# Patient Record
Sex: FEMALE | Race: WHITE | NOT HISPANIC OR LATINO | URBAN - METROPOLITAN AREA
[De-identification: names, ages, dates, MRNs, and addresses within clinical notes are randomized per-mention and may not be internally consistent; named-entity substitution may affect disease eponyms.]

---

## 2024-03-28 ENCOUNTER — INPATIENT (INPATIENT)
Facility: HOSPITAL | Age: 29
LOS: 3 days | Discharge: HOME | End: 2024-04-01
Attending: OBSTETRICS & GYNECOLOGY | Admitting: OBSTETRICS & GYNECOLOGY
Payer: COMMERCIAL

## 2024-03-28 VITALS — SYSTOLIC BLOOD PRESSURE: 124 MMHG | DIASTOLIC BLOOD PRESSURE: 83 MMHG

## 2024-03-28 DIAGNOSIS — Z90.89 ACQUIRED ABSENCE OF OTHER ORGANS: Chronic | ICD-10-CM

## 2024-03-28 DIAGNOSIS — Z34.80 ENCOUNTER FOR SUPERVISION OF OTHER NORMAL PREGNANCY, UNSPECIFIED TRIMESTER: ICD-10-CM

## 2024-03-28 DIAGNOSIS — O26.899 OTHER SPECIFIED PREGNANCY RELATED CONDITIONS, UNSPECIFIED TRIMESTER: ICD-10-CM

## 2024-03-28 DIAGNOSIS — K08.409 PARTIAL LOSS OF TEETH, UNSPECIFIED CAUSE, UNSPECIFIED CLASS: Chronic | ICD-10-CM

## 2024-03-28 DIAGNOSIS — Z98.890 OTHER SPECIFIED POSTPROCEDURAL STATES: Chronic | ICD-10-CM

## 2024-03-28 DIAGNOSIS — Z34.90 ENCOUNTER FOR SUPERVISION OF NORMAL PREGNANCY, UNSPECIFIED, UNSPECIFIED TRIMESTER: ICD-10-CM

## 2024-03-28 LAB
ALBUMIN SERPL ELPH-MCNC: 3.7 G/DL — SIGNIFICANT CHANGE UP (ref 3.3–5)
ALP SERPL-CCNC: 310 U/L — HIGH (ref 40–120)
ALT FLD-CCNC: 88 U/L — HIGH (ref 10–45)
ANION GAP SERPL CALC-SCNC: 13 MMOL/L — SIGNIFICANT CHANGE UP (ref 5–17)
APPEARANCE UR: ABNORMAL
AST SERPL-CCNC: 49 U/L — HIGH (ref 10–40)
BACTERIA # UR AUTO: NEGATIVE /HPF — SIGNIFICANT CHANGE UP
BASOPHILS # BLD AUTO: 0 K/UL — SIGNIFICANT CHANGE UP (ref 0–0.2)
BASOPHILS # BLD AUTO: 0.12 K/UL — SIGNIFICANT CHANGE UP (ref 0–0.2)
BASOPHILS NFR BLD AUTO: 0 % — SIGNIFICANT CHANGE UP (ref 0–2)
BASOPHILS NFR BLD AUTO: 0.6 % — SIGNIFICANT CHANGE UP (ref 0–2)
BILIRUB SERPL-MCNC: 0.7 MG/DL — SIGNIFICANT CHANGE UP (ref 0.2–1.2)
BILIRUB UR-MCNC: ABNORMAL
BUN SERPL-MCNC: 11 MG/DL — SIGNIFICANT CHANGE UP (ref 7–23)
CALCIUM SERPL-MCNC: 9.8 MG/DL — SIGNIFICANT CHANGE UP (ref 8.4–10.5)
CAST: 4 /LPF — SIGNIFICANT CHANGE UP (ref 0–4)
CHLORIDE SERPL-SCNC: 100 MMOL/L — SIGNIFICANT CHANGE UP (ref 96–108)
CO2 SERPL-SCNC: 21 MMOL/L — LOW (ref 22–31)
COLOR SPEC: SIGNIFICANT CHANGE UP
CREAT ?TM UR-MCNC: 168 MG/DL — SIGNIFICANT CHANGE UP
CREAT SERPL-MCNC: 0.75 MG/DL — SIGNIFICANT CHANGE UP (ref 0.5–1.3)
DIFF PNL FLD: ABNORMAL
EGFR: 112 ML/MIN/1.73M2 — SIGNIFICANT CHANGE UP
EOSINOPHIL # BLD AUTO: 0.02 K/UL — SIGNIFICANT CHANGE UP (ref 0–0.5)
EOSINOPHIL # BLD AUTO: 0.33 K/UL — SIGNIFICANT CHANGE UP (ref 0–0.5)
EOSINOPHIL NFR BLD AUTO: 0.1 % — SIGNIFICANT CHANGE UP (ref 0–6)
EOSINOPHIL NFR BLD AUTO: 1.7 % — SIGNIFICANT CHANGE UP (ref 0–6)
GLUCOSE SERPL-MCNC: 89 MG/DL — SIGNIFICANT CHANGE UP (ref 70–99)
GLUCOSE UR QL: NEGATIVE MG/DL — SIGNIFICANT CHANGE UP
HCT VFR BLD CALC: 36.9 % — SIGNIFICANT CHANGE UP (ref 34.5–45)
HCT VFR BLD CALC: 38 % — SIGNIFICANT CHANGE UP (ref 34.5–45)
HGB BLD-MCNC: 12.6 G/DL — SIGNIFICANT CHANGE UP (ref 11.5–15.5)
HGB BLD-MCNC: 13 G/DL — SIGNIFICANT CHANGE UP (ref 11.5–15.5)
IMM GRANULOCYTES NFR BLD AUTO: 0.9 % — SIGNIFICANT CHANGE UP (ref 0–0.9)
KETONES UR-MCNC: NEGATIVE MG/DL — SIGNIFICANT CHANGE UP
LDH SERPL L TO P-CCNC: 179 U/L — SIGNIFICANT CHANGE UP (ref 50–242)
LEUKOCYTE ESTERASE UR-ACNC: ABNORMAL
LYMPHOCYTES # BLD AUTO: 1.97 K/UL — SIGNIFICANT CHANGE UP (ref 1–3.3)
LYMPHOCYTES # BLD AUTO: 10 % — LOW (ref 13–44)
LYMPHOCYTES # BLD AUTO: 12.2 % — LOW (ref 13–44)
LYMPHOCYTES # BLD AUTO: 2.34 K/UL — SIGNIFICANT CHANGE UP (ref 1–3.3)
MANUAL SMEAR VERIFICATION: SIGNIFICANT CHANGE UP
MCHC RBC-ENTMCNC: 28.2 PG — SIGNIFICANT CHANGE UP (ref 27–34)
MCHC RBC-ENTMCNC: 28.5 PG — SIGNIFICANT CHANGE UP (ref 27–34)
MCHC RBC-ENTMCNC: 33.2 GM/DL — SIGNIFICANT CHANGE UP (ref 32–36)
MCHC RBC-ENTMCNC: 35.2 GM/DL — SIGNIFICANT CHANGE UP (ref 32–36)
MCV RBC AUTO: 80.9 FL — SIGNIFICANT CHANGE UP (ref 80–100)
MCV RBC AUTO: 85 FL — SIGNIFICANT CHANGE UP (ref 80–100)
MONOCYTES # BLD AUTO: 1.33 K/UL — HIGH (ref 0–0.9)
MONOCYTES # BLD AUTO: 1.56 K/UL — HIGH (ref 0–0.9)
MONOCYTES NFR BLD AUTO: 6.9 % — SIGNIFICANT CHANGE UP (ref 2–14)
MONOCYTES NFR BLD AUTO: 7.9 % — SIGNIFICANT CHANGE UP (ref 2–14)
MYELOCYTES NFR BLD: 0.9 % — HIGH (ref 0–0)
NEUTROPHILS # BLD AUTO: 15.04 K/UL — HIGH (ref 1.8–7.4)
NEUTROPHILS # BLD AUTO: 15.86 K/UL — HIGH (ref 1.8–7.4)
NEUTROPHILS NFR BLD AUTO: 77.4 % — HIGH (ref 43–77)
NEUTROPHILS NFR BLD AUTO: 80.5 % — HIGH (ref 43–77)
NEUTS BAND # BLD: 0.9 % — SIGNIFICANT CHANGE UP (ref 0–8)
NITRITE UR-MCNC: NEGATIVE — SIGNIFICANT CHANGE UP
NRBC # BLD: 0 /100 WBCS — SIGNIFICANT CHANGE UP (ref 0–0)
PH UR: 7 — SIGNIFICANT CHANGE UP (ref 5–8)
PLAT MORPH BLD: NORMAL — SIGNIFICANT CHANGE UP
PLATELET # BLD AUTO: 305 K/UL — SIGNIFICANT CHANGE UP (ref 150–400)
PLATELET # BLD AUTO: 325 K/UL — SIGNIFICANT CHANGE UP (ref 150–400)
POTASSIUM SERPL-MCNC: 5 MMOL/L — SIGNIFICANT CHANGE UP (ref 3.5–5.3)
POTASSIUM SERPL-SCNC: 5 MMOL/L — SIGNIFICANT CHANGE UP (ref 3.5–5.3)
PROT ?TM UR-MCNC: 262 MG/DL — HIGH (ref 0–12)
PROT SERPL-MCNC: 7.3 G/DL — SIGNIFICANT CHANGE UP (ref 6–8.3)
PROT UR-MCNC: 300 MG/DL
PROT/CREAT UR-RTO: 1.6 RATIO — HIGH (ref 0–0.2)
RBC # BLD: 4.47 M/UL — SIGNIFICANT CHANGE UP (ref 3.8–5.2)
RBC # BLD: 4.56 M/UL — SIGNIFICANT CHANGE UP (ref 3.8–5.2)
RBC # FLD: 13.4 % — SIGNIFICANT CHANGE UP (ref 10.3–14.5)
RBC # FLD: 13.6 % — SIGNIFICANT CHANGE UP (ref 10.3–14.5)
RBC BLD AUTO: SIGNIFICANT CHANGE UP
RBC CASTS # UR COMP ASSIST: 612 /HPF — HIGH (ref 0–4)
SODIUM SERPL-SCNC: 134 MMOL/L — LOW (ref 135–145)
SP GR SPEC: 1.02 — SIGNIFICANT CHANGE UP (ref 1–1.03)
SQUAMOUS # UR AUTO: 6 /HPF — HIGH (ref 0–5)
URATE SERPL-MCNC: 5 MG/DL — SIGNIFICANT CHANGE UP (ref 2.5–7)
UROBILINOGEN FLD QL: 1 MG/DL — SIGNIFICANT CHANGE UP (ref 0.2–1)
WBC # BLD: 19.21 K/UL — HIGH (ref 3.8–10.5)
WBC # BLD: 19.7 K/UL — HIGH (ref 3.8–10.5)
WBC # FLD AUTO: 19.21 K/UL — HIGH (ref 3.8–10.5)
WBC # FLD AUTO: 19.7 K/UL — HIGH (ref 3.8–10.5)
WBC UR QL: 20 /HPF — HIGH (ref 0–5)

## 2024-03-28 RX ORDER — PRAMOXINE HYDROCHLORIDE 150 MG/15G
1 AEROSOL, FOAM RECTAL EVERY 4 HOURS
Refills: 0 | Status: DISCONTINUED | OUTPATIENT
Start: 2024-03-28 | End: 2024-04-01

## 2024-03-28 RX ORDER — PIPERACILLIN AND TAZOBACTAM 4; .5 G/20ML; G/20ML
4.5 INJECTION, POWDER, LYOPHILIZED, FOR SOLUTION INTRAVENOUS EVERY 8 HOURS
Refills: 0 | Status: DISCONTINUED | OUTPATIENT
Start: 2024-03-29 | End: 2024-03-29

## 2024-03-28 RX ORDER — AER TRAVELER 0.5 G/1
1 SOLUTION RECTAL; TOPICAL EVERY 4 HOURS
Refills: 0 | Status: DISCONTINUED | OUTPATIENT
Start: 2024-03-28 | End: 2024-04-01

## 2024-03-28 RX ORDER — BENZOCAINE 10 %
1 GEL (GRAM) MUCOUS MEMBRANE EVERY 6 HOURS
Refills: 0 | Status: DISCONTINUED | OUTPATIENT
Start: 2024-03-28 | End: 2024-04-01

## 2024-03-28 RX ORDER — PIPERACILLIN AND TAZOBACTAM 4; .5 G/20ML; G/20ML
4.5 INJECTION, POWDER, LYOPHILIZED, FOR SOLUTION INTRAVENOUS ONCE
Refills: 0 | Status: COMPLETED | OUTPATIENT
Start: 2024-03-28 | End: 2024-03-28

## 2024-03-28 RX ORDER — OXYTOCIN 10 UNIT/ML
41.67 VIAL (ML) INJECTION
Qty: 20 | Refills: 0 | Status: DISCONTINUED | OUTPATIENT
Start: 2024-03-28 | End: 2024-04-01

## 2024-03-28 RX ORDER — MAGNESIUM SULFATE 500 MG/ML
4 VIAL (ML) INJECTION ONCE
Refills: 0 | Status: COMPLETED | OUTPATIENT
Start: 2024-03-28 | End: 2024-03-28

## 2024-03-28 RX ORDER — LANOLIN
1 OINTMENT (GRAM) TOPICAL EVERY 6 HOURS
Refills: 0 | Status: DISCONTINUED | OUTPATIENT
Start: 2024-03-28 | End: 2024-04-01

## 2024-03-28 RX ORDER — SIMETHICONE 80 MG/1
80 TABLET, CHEWABLE ORAL EVERY 4 HOURS
Refills: 0 | Status: DISCONTINUED | OUTPATIENT
Start: 2024-03-28 | End: 2024-04-01

## 2024-03-28 RX ORDER — ACETAMINOPHEN 500 MG
1000 TABLET ORAL ONCE
Refills: 0 | Status: COMPLETED | OUTPATIENT
Start: 2024-03-28 | End: 2024-03-28

## 2024-03-28 RX ORDER — TETANUS TOXOID, REDUCED DIPHTHERIA TOXOID AND ACELLULAR PERTUSSIS VACCINE, ADSORBED 5; 2.5; 8; 8; 2.5 [IU]/.5ML; [IU]/.5ML; UG/.5ML; UG/.5ML; UG/.5ML
0.5 SUSPENSION INTRAMUSCULAR ONCE
Refills: 0 | Status: DISCONTINUED | OUTPATIENT
Start: 2024-03-28 | End: 2024-04-01

## 2024-03-28 RX ORDER — MAGNESIUM HYDROXIDE 400 MG/1
30 TABLET, CHEWABLE ORAL
Refills: 0 | Status: DISCONTINUED | OUTPATIENT
Start: 2024-03-28 | End: 2024-04-01

## 2024-03-28 RX ORDER — CHLORHEXIDINE GLUCONATE 213 G/1000ML
1 SOLUTION TOPICAL DAILY
Refills: 0 | Status: DISCONTINUED | OUTPATIENT
Start: 2024-03-28 | End: 2024-03-29

## 2024-03-28 RX ORDER — IBUPROFEN 200 MG
600 TABLET ORAL EVERY 6 HOURS
Refills: 0 | Status: COMPLETED | OUTPATIENT
Start: 2024-03-28 | End: 2025-02-24

## 2024-03-28 RX ORDER — DIPHENHYDRAMINE HCL 50 MG
25 CAPSULE ORAL EVERY 6 HOURS
Refills: 0 | Status: DISCONTINUED | OUTPATIENT
Start: 2024-03-28 | End: 2024-04-01

## 2024-03-28 RX ORDER — SODIUM CHLORIDE 9 MG/ML
1000 INJECTION, SOLUTION INTRAVENOUS
Refills: 0 | Status: DISCONTINUED | OUTPATIENT
Start: 2024-03-28 | End: 2024-03-29

## 2024-03-28 RX ORDER — KETOROLAC TROMETHAMINE 30 MG/ML
30 SYRINGE (ML) INJECTION ONCE
Refills: 0 | Status: DISCONTINUED | OUTPATIENT
Start: 2024-03-28 | End: 2024-03-29

## 2024-03-28 RX ORDER — ACETAMINOPHEN 500 MG
975 TABLET ORAL
Refills: 0 | Status: DISCONTINUED | OUTPATIENT
Start: 2024-03-28 | End: 2024-04-01

## 2024-03-28 RX ORDER — PIPERACILLIN AND TAZOBACTAM 4; .5 G/20ML; G/20ML
4.5 INJECTION, POWDER, LYOPHILIZED, FOR SOLUTION INTRAVENOUS ONCE
Refills: 0 | Status: DISCONTINUED | OUTPATIENT
Start: 2024-03-28 | End: 2024-03-29

## 2024-03-28 RX ORDER — OXYCODONE HYDROCHLORIDE 5 MG/1
5 TABLET ORAL ONCE
Refills: 0 | Status: DISCONTINUED | OUTPATIENT
Start: 2024-03-28 | End: 2024-04-01

## 2024-03-28 RX ORDER — SODIUM CHLORIDE 9 MG/ML
1000 INJECTION, SOLUTION INTRAVENOUS ONCE
Refills: 0 | Status: COMPLETED | OUTPATIENT
Start: 2024-03-28 | End: 2024-03-28

## 2024-03-28 RX ORDER — ONDANSETRON 8 MG/1
4 TABLET, FILM COATED ORAL ONCE
Refills: 0 | Status: COMPLETED | OUTPATIENT
Start: 2024-03-28 | End: 2024-03-28

## 2024-03-28 RX ORDER — HYDROCORTISONE 1 %
1 OINTMENT (GRAM) TOPICAL EVERY 6 HOURS
Refills: 0 | Status: DISCONTINUED | OUTPATIENT
Start: 2024-03-28 | End: 2024-04-01

## 2024-03-28 RX ORDER — DIBUCAINE 1 %
1 OINTMENT (GRAM) RECTAL EVERY 6 HOURS
Refills: 0 | Status: DISCONTINUED | OUTPATIENT
Start: 2024-03-28 | End: 2024-04-01

## 2024-03-28 RX ORDER — MAGNESIUM SULFATE 500 MG/ML
2 VIAL (ML) INJECTION
Qty: 40 | Refills: 0 | Status: DISCONTINUED | OUTPATIENT
Start: 2024-03-28 | End: 2024-03-29

## 2024-03-28 RX ORDER — OXYCODONE HYDROCHLORIDE 5 MG/1
5 TABLET ORAL
Refills: 0 | Status: DISCONTINUED | OUTPATIENT
Start: 2024-03-28 | End: 2024-04-01

## 2024-03-28 RX ORDER — CITRIC ACID/SODIUM CITRATE 300-500 MG
15 SOLUTION, ORAL ORAL EVERY 6 HOURS
Refills: 0 | Status: DISCONTINUED | OUTPATIENT
Start: 2024-03-28 | End: 2024-03-29

## 2024-03-28 RX ORDER — OXYTOCIN 10 UNIT/ML
333.33 VIAL (ML) INJECTION
Qty: 20 | Refills: 0 | Status: DISCONTINUED | OUTPATIENT
Start: 2024-03-28 | End: 2024-04-01

## 2024-03-28 RX ORDER — SODIUM CHLORIDE 9 MG/ML
3 INJECTION INTRAMUSCULAR; INTRAVENOUS; SUBCUTANEOUS EVERY 8 HOURS
Refills: 0 | Status: DISCONTINUED | OUTPATIENT
Start: 2024-03-28 | End: 2024-04-01

## 2024-03-28 RX ADMIN — PIPERACILLIN AND TAZOBACTAM 200 GRAM(S): 4; .5 INJECTION, POWDER, LYOPHILIZED, FOR SOLUTION INTRAVENOUS at 18:06

## 2024-03-28 RX ADMIN — Medication 300 GRAM(S): at 18:01

## 2024-03-28 RX ADMIN — Medication 400 MILLIGRAM(S): at 17:45

## 2024-03-28 RX ADMIN — SODIUM CHLORIDE 1000 MILLILITER(S): 9 INJECTION, SOLUTION INTRAVENOUS at 16:22

## 2024-03-28 RX ADMIN — SODIUM CHLORIDE 125 MILLILITER(S): 9 INJECTION, SOLUTION INTRAVENOUS at 11:52

## 2024-03-28 RX ADMIN — ONDANSETRON 4 MILLIGRAM(S): 8 TABLET, FILM COATED ORAL at 17:47

## 2024-03-28 RX ADMIN — ONDANSETRON 4 MILLIGRAM(S): 8 TABLET, FILM COATED ORAL at 19:27

## 2024-03-28 RX ADMIN — Medication 50 GM/HR: at 18:01

## 2024-03-28 NOTE — OB RN DELIVERY SUMMARY - NS_SEPSISRSKCALC_OBGYN_ALL_OB_FT
EOS calculated successfully. EOS Risk Factor: 0.5/1000 live births ([facility] national incidence); GA=40w6d; Temp=100.58; ROM=8.967; GBS='Negative'; Antibiotics='Broad spectrum antibiotics 2-3.9 hrs prior to birth'

## 2024-03-28 NOTE — PRE-ANESTHESIA EVALUATION ADULT - NSANTHOSAYNRD_GEN_A_CORE
No. ISABELA screening performed.  STOP BANG Legend: 0-2 = LOW Risk; 3-4 = INTERMEDIATE Risk; 5-8 = HIGH Risk

## 2024-03-28 NOTE — OB PROVIDER LABOR PROGRESS NOTE - ASSESSMENT
Pt pushing with progression of station  - Low UOP likely related to sPEC  - Mg for seizure ppx  - LFT also elevated.  - Anticipate     Aris Randolph MD 
Pt is a 27yo  admitted for IOL for sPEC.    - Continue cont EFM, toco, IVF  - Continue expectant management  - Zosyn for chorioamnionitis    Belkys Cornelius MD PGY1
AROM - meconium  - ctx q2-4m, will hod off pitocin as patient making cervical change and just AROM.    Aris Randolph MD 
Pt making cervical change and appropriate labor progression  - CMP with elevated LFTs, meeting criteria for sPEC.  - Denies headaches, fevers, chills, changes in vision, nausea, vomiting, or RUQ pain at this time  - Will MgSO4 for seizure ppx  -  Pt also febrile to 100.4 with fetal tachycardia and elevated WBC  - Meets criteria for chorioamnionitis  - Will start Zosyn for chorio treatment.    Will recheck in one hour. Goal for vaginal delivery as pt is 9.5cm. Pt understands all new dx    Aris Randolph MD 
Pt is making cervical change and is s/p AROM. +response to VE, reassuring.  - Pt has had labile BPs while on L&D and now meets criteria for gHTN  - Will send off CBC/CMP at this time  - Strict I+Os, UOP <20cc/hr, will give 1L LR bolus    Aris Randolph MD

## 2024-03-28 NOTE — OB PROVIDER DELIVERY SUMMARY - NSPROVIDERDELIVERYNOTE_OBGYN_ALL_OB_FT
Spontaneous vaginal delivery of liveborn infant from OA position. Head, shoulders, and body delivered through nuchal cordx1 Infant was suctioned. Heavy mec. Cord was clamped and cut and infant was passed to peds who were present at delivery. Placenta delivered intact with a 3 vessel cord manually. Fundal massage was given and uterine fundus was found to be firm with then intermittent atony. Pitocin IM and Cytotec rectally was given. Vaginal exam revealed an intact cervix, vaginal walls and sulci. Patient had a 2nd degree laceration in the perineum that was repaired with 2.0 vicryl suture. Excellent hemostasis was noted. Ruiz catheter was reinserted into the bladder for strict I+Os. Patient was stable. Count was correct x 2.

## 2024-03-28 NOTE — OB PROVIDER LABOR PROGRESS NOTE - NS_SUBJECTIVE/OBJECTIVE_OBGYN_ALL_OB_FT
Pt evaluated for cervical change
Pt evaluated for cervical change
Note delayed as pushing with patient.
S:  Pt seen and examined for increased pain    O:  Vital Signs Last 24 Hrs  T(C): 38.0 (28 Mar 2024 17:25), Max: 38.0 (28 Mar 2024 17:25)  T(F): 100.4 (28 Mar 2024 17:25), Max: 100.4 (28 Mar 2024 17:25)  HR: 108 (28 Mar 2024 18:31) (77 - 130)  BP: 140/75 (28 Mar 2024 18:20) (108/62 - 153/91)  BP(mean): --  RR: 18 (28 Mar 2024 13:15) (18 - 18)  SpO2: 97% (28 Mar 2024 18:31) (70% - 100%)    Parameters below as of 28 Mar 2024 13:15  Patient On (Oxygen Delivery Method): room air
Pt evaluated for fetal tachycardia

## 2024-03-28 NOTE — OB PROVIDER H&P - NS ATTEND AMEND GEN_ALL_CORE FT
I have personally seen, examined, and participated in the care of this patient. I have reviewed all pertinent clinical information, including history, physical exam, plan, and the Resident 's note and agree except as noted.    Aris Randolph MD

## 2024-03-28 NOTE — OB PROVIDER H&P - PROBLEM SELECTOR PLAN 1
28 year old  at 40.6 weeks in labor, -GBS    -Admit to L and D  -Routine labs  -NPO/IVF  -Bictra  -EFM/toco  -Anesthesia consult  -Expectant management  -Anticipate

## 2024-03-28 NOTE — OB PROVIDER H&P - NSICDXPASTSURGICALHX_GEN_ALL_CORE_FT
PAST SURGICAL HISTORY:  History of D&C     History of tonsillectomy     [place] teeth extracted

## 2024-03-28 NOTE — OB PROVIDER H&P - ABORTIONS, OB PROFILE
Schedule in open green slot and discuss fastpass    Electronically Signed by: Anmol Gutierrez MD, 6/23/2022 1

## 2024-03-28 NOTE — OB PROVIDER LABOR PROGRESS NOTE - NS_OBIHIFHRDETAILS_OBGYN_ALL_OB_FT
160, mod ad, + accels, -decels CAT2 but reassuring
165, mod ad, - accels, - decels CAT2
155 mod ad, Accel with examination, no decels CAT1
Baseline: 170 bpm, moderate variability,  - accels, - decels
155, mod ad, - accels, - decels CAT1

## 2024-03-28 NOTE — OB RN PATIENT PROFILE - NSICDXPASTSURGICALHX_GEN_ALL_CORE_FT
PAST SURGICAL HISTORY:  History of D&C     History of tonsillectomy     [place] teeth extracted

## 2024-03-28 NOTE — OB PROVIDER H&P - ASSESSMENT
28 year old  at 40.6 weeks in labor, -GBS    -Admit to L and D  -Routine labs  -NPO/IVF  -Bictra  -EFM/toco  -Anesthesia consult  -Expectant management  -Anticipate     Seen w/ MD Randolph at bedside  Nadia Guzman [facility]-BC

## 2024-03-28 NOTE — OB RN PATIENT PROFILE - NS_OBGYNHISTORY_OBGYN_ALL_OB_FT
Hx. abnormal pap smear (resolved), colposcopy Hx. abnormal pap smear (resolved), colposcopy, misc x1 w/ d&c

## 2024-03-28 NOTE — OB RN DELIVERY SUMMARY - NSSELHIDDEN_OBGYN_ALL_OB_FT
Normal vision: sees adequately in most situations; can see medication labels, newsprint [NS_DeliveryAttending1_OBGYN_ALL_OB_FT:[id number]=],[NS_DeliveryAssist1_OBGYN_ALL_OB_FT:[id number]=],[NS_DeliveryRN_OBGYN_ALL_OB_FT:[id number]=]

## 2024-03-28 NOTE — OB PROVIDER H&P - NSPRENATALCARE_OBGYN_ALL_OB
Patient wants to know do she have to have the MRI or can she just get the Cortisone shot in her Right Shoulder. Please call her back thank you   Yes

## 2024-03-28 NOTE — OB PROVIDER H&P - HISTORY OF PRESENT ILLNESS
28 year old  at 40.6 weeks presents with contractions q3 min since 5am. -LOF, -VB, +FM. Uncomplicated pregnancy. -GBS    ObHx: MAB x1 s/p d&c  MedHx: denies  SurgHx: d&c; wisdom teeth extraction; tonsillectomy  GynHx: h/o abnormal pap s/p colposcopy ; denies fibroids, cysts, STIs  SocialHx: denies ETOH, tobacco, drug use  PsychH: denies anxiety, depression  All: NKDA, NKEA, NKFA  Meds: PNV, Albuterol PRN    Vital Signs Last 24 Hrs  T(C): --  T(F): --  HR: 84 (28 Mar 2024 10:46) (84 - 98)  BP: 124/83 (28 Mar 2024 10:35) (124/83 - 124/83)  BP(mean): --  RR: --  SpO2: 98% (28 Mar 2024 10:46) (90% - 99%)    PE: NAD, AOx3, abdomen soft gravid  VE: 4.0/90/-3  EFM: 155, moderate variability, -accels, -decels  [place]: q2min   EFW: 3600 grams

## 2024-03-29 LAB
ALBUMIN SERPL ELPH-MCNC: 2.9 G/DL — LOW (ref 3.3–5)
ALBUMIN SERPL ELPH-MCNC: 3.3 G/DL — SIGNIFICANT CHANGE UP (ref 3.3–5)
ALP SERPL-CCNC: 245 U/L — HIGH (ref 40–120)
ALP SERPL-CCNC: 307 U/L — HIGH (ref 40–120)
ALT FLD-CCNC: 73 U/L — HIGH (ref 10–45)
ALT FLD-CCNC: 98 U/L — HIGH (ref 10–45)
ANION GAP SERPL CALC-SCNC: 15 MMOL/L — SIGNIFICANT CHANGE UP (ref 5–17)
ANION GAP SERPL CALC-SCNC: 20 MMOL/L — HIGH (ref 5–17)
AST SERPL-CCNC: 51 U/L — HIGH (ref 10–40)
AST SERPL-CCNC: 71 U/L — HIGH (ref 10–40)
BASOPHILS # BLD AUTO: 0 K/UL — SIGNIFICANT CHANGE UP (ref 0–0.2)
BASOPHILS NFR BLD AUTO: 0 % — SIGNIFICANT CHANGE UP (ref 0–2)
BILIRUB SERPL-MCNC: 0.7 MG/DL — SIGNIFICANT CHANGE UP (ref 0.2–1.2)
BILIRUB SERPL-MCNC: 0.7 MG/DL — SIGNIFICANT CHANGE UP (ref 0.2–1.2)
BUN SERPL-MCNC: 12 MG/DL — SIGNIFICANT CHANGE UP (ref 7–23)
BUN SERPL-MCNC: 13 MG/DL — SIGNIFICANT CHANGE UP (ref 7–23)
CALCIUM SERPL-MCNC: 8.8 MG/DL — SIGNIFICANT CHANGE UP (ref 8.4–10.5)
CALCIUM SERPL-MCNC: 9.6 MG/DL — SIGNIFICANT CHANGE UP (ref 8.4–10.5)
CHLORIDE SERPL-SCNC: 97 MMOL/L — SIGNIFICANT CHANGE UP (ref 96–108)
CHLORIDE SERPL-SCNC: 99 MMOL/L — SIGNIFICANT CHANGE UP (ref 96–108)
CO2 SERPL-SCNC: 18 MMOL/L — LOW (ref 22–31)
CO2 SERPL-SCNC: 19 MMOL/L — LOW (ref 22–31)
CREAT SERPL-MCNC: 1.05 MG/DL — SIGNIFICANT CHANGE UP (ref 0.5–1.3)
CREAT SERPL-MCNC: 1.26 MG/DL — SIGNIFICANT CHANGE UP (ref 0.5–1.3)
EGFR: 60 ML/MIN/1.73M2 — SIGNIFICANT CHANGE UP
EGFR: 75 ML/MIN/1.73M2 — SIGNIFICANT CHANGE UP
EOSINOPHIL # BLD AUTO: 0 K/UL — SIGNIFICANT CHANGE UP (ref 0–0.5)
EOSINOPHIL NFR BLD AUTO: 0 % — SIGNIFICANT CHANGE UP (ref 0–6)
GLUCOSE SERPL-MCNC: 106 MG/DL — HIGH (ref 70–99)
GLUCOSE SERPL-MCNC: 148 MG/DL — HIGH (ref 70–99)
HCT VFR BLD CALC: 33.3 % — LOW (ref 34.5–45)
HCT VFR BLD CALC: 37.9 % — SIGNIFICANT CHANGE UP (ref 34.5–45)
HGB BLD-MCNC: 11.1 G/DL — LOW (ref 11.5–15.5)
HGB BLD-MCNC: 12.3 G/DL — SIGNIFICANT CHANGE UP (ref 11.5–15.5)
HYPOSEGMENTATION: PRESENT — SIGNIFICANT CHANGE UP
LDH SERPL L TO P-CCNC: 268 U/L — HIGH (ref 50–242)
LYMPHOCYTES # BLD AUTO: 1.45 K/UL — SIGNIFICANT CHANGE UP (ref 1–3.3)
LYMPHOCYTES # BLD AUTO: 4 % — LOW (ref 13–44)
MAGNESIUM SERPL-MCNC: 4.4 MG/DL — HIGH (ref 1.6–2.6)
MAGNESIUM SERPL-MCNC: 4.7 MG/DL — HIGH (ref 1.6–2.6)
MAGNESIUM SERPL-MCNC: 5.2 MG/DL — HIGH (ref 1.6–2.6)
MAGNESIUM SERPL-MCNC: 6 MG/DL — HIGH (ref 1.6–2.6)
MANUAL SMEAR VERIFICATION: SIGNIFICANT CHANGE UP
MCHC RBC-ENTMCNC: 28 PG — SIGNIFICANT CHANGE UP (ref 27–34)
MCHC RBC-ENTMCNC: 28.2 PG — SIGNIFICANT CHANGE UP (ref 27–34)
MCHC RBC-ENTMCNC: 32.5 GM/DL — SIGNIFICANT CHANGE UP (ref 32–36)
MCHC RBC-ENTMCNC: 33.3 GM/DL — SIGNIFICANT CHANGE UP (ref 32–36)
MCV RBC AUTO: 84.5 FL — SIGNIFICANT CHANGE UP (ref 80–100)
MCV RBC AUTO: 86.1 FL — SIGNIFICANT CHANGE UP (ref 80–100)
MONOCYTES # BLD AUTO: 1.71 K/UL — HIGH (ref 0–0.9)
MONOCYTES NFR BLD AUTO: 4.7 % — SIGNIFICANT CHANGE UP (ref 2–14)
NEUTROPHILS # BLD AUTO: 33.17 K/UL — HIGH (ref 1.8–7.4)
NEUTROPHILS NFR BLD AUTO: 85.8 % — HIGH (ref 43–77)
NEUTS BAND # BLD: 5.5 % — SIGNIFICANT CHANGE UP (ref 0–8)
NRBC # BLD: 0 /100 WBCS — SIGNIFICANT CHANGE UP (ref 0–0)
PLAT MORPH BLD: NORMAL — SIGNIFICANT CHANGE UP
PLATELET # BLD AUTO: 299 K/UL — SIGNIFICANT CHANGE UP (ref 150–400)
PLATELET # BLD AUTO: 325 K/UL — SIGNIFICANT CHANGE UP (ref 150–400)
POTASSIUM SERPL-MCNC: 4.1 MMOL/L — SIGNIFICANT CHANGE UP (ref 3.5–5.3)
POTASSIUM SERPL-MCNC: 4.9 MMOL/L — SIGNIFICANT CHANGE UP (ref 3.5–5.3)
POTASSIUM SERPL-SCNC: 4.1 MMOL/L — SIGNIFICANT CHANGE UP (ref 3.5–5.3)
POTASSIUM SERPL-SCNC: 4.9 MMOL/L — SIGNIFICANT CHANGE UP (ref 3.5–5.3)
PROT SERPL-MCNC: 5.9 G/DL — LOW (ref 6–8.3)
PROT SERPL-MCNC: 6.6 G/DL — SIGNIFICANT CHANGE UP (ref 6–8.3)
RBC # BLD: 3.94 M/UL — SIGNIFICANT CHANGE UP (ref 3.8–5.2)
RBC # BLD: 4.4 M/UL — SIGNIFICANT CHANGE UP (ref 3.8–5.2)
RBC # FLD: 13.4 % — SIGNIFICANT CHANGE UP (ref 10.3–14.5)
RBC # FLD: 13.6 % — SIGNIFICANT CHANGE UP (ref 10.3–14.5)
RBC BLD AUTO: SIGNIFICANT CHANGE UP
SODIUM SERPL-SCNC: 131 MMOL/L — LOW (ref 135–145)
SODIUM SERPL-SCNC: 137 MMOL/L — SIGNIFICANT CHANGE UP (ref 135–145)
T PALLIDUM AB TITR SER: NEGATIVE — SIGNIFICANT CHANGE UP
URATE SERPL-MCNC: 5.6 MG/DL — SIGNIFICANT CHANGE UP (ref 2.5–7)
WBC # BLD: 32.72 K/UL — HIGH (ref 3.8–10.5)
WBC # BLD: 36.33 K/UL — HIGH (ref 3.8–10.5)
WBC # FLD AUTO: 32.72 K/UL — HIGH (ref 3.8–10.5)
WBC # FLD AUTO: 36.33 K/UL — HIGH (ref 3.8–10.5)

## 2024-03-29 RX ORDER — OXYTOCIN 10 UNIT/ML
10 VIAL (ML) INJECTION ONCE
Refills: 0 | Status: DISCONTINUED | OUTPATIENT
Start: 2024-03-29 | End: 2024-04-01

## 2024-03-29 RX ORDER — IBUPROFEN 200 MG
600 TABLET ORAL EVERY 6 HOURS
Refills: 0 | Status: DISCONTINUED | OUTPATIENT
Start: 2024-03-29 | End: 2024-04-01

## 2024-03-29 RX ORDER — MAGNESIUM SULFATE 500 MG/ML
1 VIAL (ML) INJECTION
Qty: 40 | Refills: 0 | Status: DISCONTINUED | OUTPATIENT
Start: 2024-03-29 | End: 2024-04-01

## 2024-03-29 RX ORDER — PIPERACILLIN AND TAZOBACTAM 4; .5 G/20ML; G/20ML
4.5 INJECTION, POWDER, LYOPHILIZED, FOR SOLUTION INTRAVENOUS EVERY 8 HOURS
Refills: 0 | Status: COMPLETED | OUTPATIENT
Start: 2024-03-29 | End: 2024-03-30

## 2024-03-29 RX ORDER — PIPERACILLIN AND TAZOBACTAM 4; .5 G/20ML; G/20ML
4.5 INJECTION, POWDER, LYOPHILIZED, FOR SOLUTION INTRAVENOUS EVERY 8 HOURS
Refills: 0 | Status: COMPLETED | OUTPATIENT
Start: 2024-03-29 | End: 2024-03-29

## 2024-03-29 RX ORDER — MAGNESIUM SULFATE 500 MG/ML
1 VIAL (ML) INJECTION
Qty: 40 | Refills: 0 | Status: DISCONTINUED | OUTPATIENT
Start: 2024-03-29 | End: 2024-03-29

## 2024-03-29 RX ADMIN — PIPERACILLIN AND TAZOBACTAM 200 GRAM(S): 4; .5 INJECTION, POWDER, LYOPHILIZED, FOR SOLUTION INTRAVENOUS at 09:44

## 2024-03-29 RX ADMIN — Medication 975 MILLIGRAM(S): at 03:57

## 2024-03-29 RX ADMIN — Medication 1 TABLET(S): at 12:26

## 2024-03-29 RX ADMIN — SODIUM CHLORIDE 3 MILLILITER(S): 9 INJECTION INTRAMUSCULAR; INTRAVENOUS; SUBCUTANEOUS at 12:33

## 2024-03-29 RX ADMIN — Medication 30 MILLIGRAM(S): at 01:13

## 2024-03-29 RX ADMIN — Medication 600 MILLIGRAM(S): at 21:21

## 2024-03-29 RX ADMIN — Medication 600 MILLIGRAM(S): at 08:01

## 2024-03-29 RX ADMIN — Medication 600 MILLIGRAM(S): at 22:36

## 2024-03-29 RX ADMIN — Medication 975 MILLIGRAM(S): at 13:26

## 2024-03-29 RX ADMIN — Medication 600 MILLIGRAM(S): at 14:18

## 2024-03-29 RX ADMIN — Medication 600 MILLIGRAM(S): at 09:01

## 2024-03-29 RX ADMIN — Medication 975 MILLIGRAM(S): at 03:54

## 2024-03-29 RX ADMIN — Medication 25 GM/HR: at 02:10

## 2024-03-29 RX ADMIN — Medication 25 GM/HR: at 03:55

## 2024-03-29 RX ADMIN — Medication 975 MILLIGRAM(S): at 12:26

## 2024-03-29 RX ADMIN — Medication 25 GM/HR: at 15:25

## 2024-03-29 RX ADMIN — PIPERACILLIN AND TAZOBACTAM 200 GRAM(S): 4; .5 INJECTION, POWDER, LYOPHILIZED, FOR SOLUTION INTRAVENOUS at 02:52

## 2024-03-29 RX ADMIN — SODIUM CHLORIDE 3 MILLILITER(S): 9 INJECTION INTRAMUSCULAR; INTRAVENOUS; SUBCUTANEOUS at 02:30

## 2024-03-29 RX ADMIN — Medication 975 MILLIGRAM(S): at 17:14

## 2024-03-29 RX ADMIN — PIPERACILLIN AND TAZOBACTAM 200 GRAM(S): 4; .5 INJECTION, POWDER, LYOPHILIZED, FOR SOLUTION INTRAVENOUS at 18:32

## 2024-03-29 NOTE — OB NEONATOLOGY/PEDIATRICIAN DELIVERY SUMMARY - NSPEDSNEONOTESA_OBGYN_ALL_OB_FT
Pediatrician called to delivery for Cat II tracing and thick meconium stained fluids.     Male infant born at 40.6wks via  to a 28yo  blood type A+ mother. Maternal hx of asthma otherwise no significant maternal or prenatal history. Delivery complicated by chorioamionitis.  Prenatal labs nr/immune/-, GBS - on . AROM on 3/29 at 1334 (8hrs prior to delivery) w/ meconium fluid. Delivery notable for nuchal cord x1. Baby emerged w/ poor tone.  Code 100 called. Upon arrival infant had appropriate tone, appeared vigorous, and was crying.     Infant was brought to radiant warmer and warmed, dried, stimulated and suctioned. HR>100, normal respiratory effort. APGARS of 5/9.     CPAP started at 7 MOL for increased WOB. Max support of CPAP 5,21%. Unable to wean off CPAP.     Mom is initiating breast and formula feeding. Consents to Hepatitis B vaccination. EOS score 0.30 (Tmax 38.1C, ROM 9hrs, GBS neg, Zosyn >2hrs prior to delivery).     Infant admitted to NICU for management of respiratory distress.

## 2024-03-29 NOTE — PROGRESS NOTE ADULT - ASSESSMENT
A/P: 27yo PPD#1 s/p  complicated by pre-eclampsia with severe features, manual removal, and chorioamnionitis progressing to endometritis. Pt overall well appearing, continuing on IV AB.     #SPEC   - c/w Magnesium x24h PP (1g/hr 2/2 to elevated Cr)   - HELLP labs on admission notable for AST/ALT: 49/88  -> 71/98. Repeat this AM. Holding acetaminophen  - Cr increased from 0.75 on admission to 1.26   - BP well controlled ON - CTM     #Endometritis   - Leukocytosis to 32 post delivery. f/u AM CBC   - s/p Manual removal of placenta  - C/W Zosyn (3/28 - )     #Post partum    - Pain well controlled, continue current pain regimen  - Increase ambulation, SCDs when not ambulating  - Continue regular diet    Millicent Valadez MD  PGY-3

## 2024-03-29 NOTE — PROGRESS NOTE ADULT - SUBJECTIVE AND OBJECTIVE BOX
S: Pain is well controlled with current pain regimen. She is tolerating a regular diet. Denies N/V. Ambulating without difficulty. Denies lightheadedness/dizziness. She is voiding spontaneously. Denies CP, SOB, HA, vision changes, RUQ pain, or significant LE edema. Lochia is wnl without saturation of pads/ passage of large clots.     O:  Vitals:  Vital Signs Last 24 Hrs  T(C): 36.7 (29 Mar 2024 02:36), Max: 38.1 (28 Mar 2024 21:02)  T(F): 98.1 (29 Mar 2024 02:36), Max: 100.58 (28 Mar 2024 21:02)  HR: 77 (29 Mar 2024 04:00) (74 - 131)  BP: 111/66 (29 Mar 2024 04:00) (96/61 - 166/70)  BP(mean): --  RR: 18 (29 Mar 2024 04:00) (17 - 18)  SpO2: 97% (29 Mar 2024 04:00) (70% - 100%)    Parameters below as of 29 Mar 2024 04:00  Patient On (Oxygen Delivery Method): room air        MEDICATIONS  (STANDING):  acetaminophen     Tablet .. 975 milliGRAM(s) Oral <User Schedule>  diphtheria/tetanus/pertussis (acellular) Vaccine (Adacel) 0.5 milliLiter(s) IntraMuscular once  ibuprofen  Tablet. 600 milliGRAM(s) Oral every 6 hours  magnesium sulfate Infusion 1 Gm/Hr (25 mL/Hr) IV Continuous <Continuous>  misoprostol 1000 MICROGram(s) Rectal once  oxytocin Infusion 333.333 milliUNIT(s)/Min (1000 mL/Hr) IV Continuous <Continuous>  oxytocin Infusion 41.667 milliUNIT(s)/Min (125 mL/Hr) IV Continuous <Continuous>  oxytocin Injectable 10 Unit(s) IntraMuscular once  piperacillin/tazobactam IVPB.. 4.5 Gram(s) IV Intermittent every 8 hours  prenatal multivitamin 1 Tablet(s) Oral daily  sodium chloride 0.9% lock flush 3 milliLiter(s) IV Push every 8 hours      Labs:  Blood type: A Positive  Rubella IgG: RPR: Negative                          12.3   32.72<H> >-----------< 325    ( 03-29 @ 00:12 )             37.9                        12.6   19.70<H> >-----------< 305    ( 03-28 @ 16:49 )             38.0                        13.0   19.21<H> >-----------< 325    ( 03-28 @ 11:20 )             36.9    03-29-24 @ 00:12      137  |  99  |  13  ----------------------------<  106<H>  4.9   |  18<L>  |  1.26    03-28-24 @ 16:49      134<L>  |  100  |  11  ----------------------------<  89  5.0   |  21<L>  |  0.75        Ca    9.6      29 Mar 2024 00:12  Ca    9.8      28 Mar 2024 16:49  Mg     6.0<H>     03-29    TPro  6.6  /  Alb  3.3  /  TBili  0.7  /  DBili  x   /  AST  71<H>  /  ALT  98<H>  /  AlkPhos  307<H>  03-29-24 @ 00:12  TPro  7.3  /  Alb  3.7  /  TBili  0.7  /  DBili  x   /  AST  49<H>  /  ALT  88<H>  /  AlkPhos  310<H>  03-28-24 @ 16:49          Physical Exam:  General: NAD  Abdomen: Fundal tenderness. Fundus firm and below the umbilicus   Vaginal: Lochia wnl  Extremities: No erythema/edema. No calf tenderness. SCDs in place.

## 2024-03-30 LAB
ANION GAP SERPL CALC-SCNC: 12 MMOL/L — SIGNIFICANT CHANGE UP (ref 5–17)
BASOPHILS # BLD AUTO: 0.17 K/UL — SIGNIFICANT CHANGE UP (ref 0–0.2)
BASOPHILS NFR BLD AUTO: 0.7 % — SIGNIFICANT CHANGE UP (ref 0–2)
BUN SERPL-MCNC: 17 MG/DL — SIGNIFICANT CHANGE UP (ref 7–23)
CALCIUM SERPL-MCNC: 9.6 MG/DL — SIGNIFICANT CHANGE UP (ref 8.4–10.5)
CHLORIDE SERPL-SCNC: 103 MMOL/L — SIGNIFICANT CHANGE UP (ref 96–108)
CO2 SERPL-SCNC: 23 MMOL/L — SIGNIFICANT CHANGE UP (ref 22–31)
CREAT SERPL-MCNC: 0.93 MG/DL — SIGNIFICANT CHANGE UP (ref 0.5–1.3)
EGFR: 86 ML/MIN/1.73M2 — SIGNIFICANT CHANGE UP
EOSINOPHIL # BLD AUTO: 0.23 K/UL — SIGNIFICANT CHANGE UP (ref 0–0.5)
EOSINOPHIL NFR BLD AUTO: 0.9 % — SIGNIFICANT CHANGE UP (ref 0–6)
GLUCOSE SERPL-MCNC: 63 MG/DL — LOW (ref 70–99)
HCT VFR BLD CALC: 31.8 % — LOW (ref 34.5–45)
HGB BLD-MCNC: 10.9 G/DL — LOW (ref 11.5–15.5)
IMM GRANULOCYTES NFR BLD AUTO: 1.2 % — HIGH (ref 0–0.9)
LYMPHOCYTES # BLD AUTO: 12.4 % — LOW (ref 13–44)
LYMPHOCYTES # BLD AUTO: 3.14 K/UL — SIGNIFICANT CHANGE UP (ref 1–3.3)
MCHC RBC-ENTMCNC: 29.1 PG — SIGNIFICANT CHANGE UP (ref 27–34)
MCHC RBC-ENTMCNC: 34.3 GM/DL — SIGNIFICANT CHANGE UP (ref 32–36)
MCV RBC AUTO: 84.8 FL — SIGNIFICANT CHANGE UP (ref 80–100)
MONOCYTES # BLD AUTO: 1.78 K/UL — HIGH (ref 0–0.9)
MONOCYTES NFR BLD AUTO: 7.1 % — SIGNIFICANT CHANGE UP (ref 2–14)
NEUTROPHILS # BLD AUTO: 19.61 K/UL — HIGH (ref 1.8–7.4)
NEUTROPHILS NFR BLD AUTO: 77.7 % — HIGH (ref 43–77)
NRBC # BLD: 0 /100 WBCS — SIGNIFICANT CHANGE UP (ref 0–0)
PLATELET # BLD AUTO: 343 K/UL — SIGNIFICANT CHANGE UP (ref 150–400)
POTASSIUM SERPL-MCNC: 4.2 MMOL/L — SIGNIFICANT CHANGE UP (ref 3.5–5.3)
POTASSIUM SERPL-SCNC: 4.2 MMOL/L — SIGNIFICANT CHANGE UP (ref 3.5–5.3)
RBC # BLD: 3.75 M/UL — LOW (ref 3.8–5.2)
RBC # FLD: 13.7 % — SIGNIFICANT CHANGE UP (ref 10.3–14.5)
SODIUM SERPL-SCNC: 138 MMOL/L — SIGNIFICANT CHANGE UP (ref 135–145)
WBC # BLD: 25.23 K/UL — HIGH (ref 3.8–10.5)
WBC # FLD AUTO: 25.23 K/UL — HIGH (ref 3.8–10.5)

## 2024-03-30 RX ADMIN — Medication 975 MILLIGRAM(S): at 12:26

## 2024-03-30 RX ADMIN — Medication 1 TABLET(S): at 18:29

## 2024-03-30 RX ADMIN — Medication 975 MILLIGRAM(S): at 06:21

## 2024-03-30 RX ADMIN — PIPERACILLIN AND TAZOBACTAM 200 GRAM(S): 4; .5 INJECTION, POWDER, LYOPHILIZED, FOR SOLUTION INTRAVENOUS at 02:35

## 2024-03-30 RX ADMIN — PIPERACILLIN AND TAZOBACTAM 200 GRAM(S): 4; .5 INJECTION, POWDER, LYOPHILIZED, FOR SOLUTION INTRAVENOUS at 10:45

## 2024-03-30 RX ADMIN — Medication 600 MILLIGRAM(S): at 02:35

## 2024-03-30 RX ADMIN — Medication 600 MILLIGRAM(S): at 22:35

## 2024-03-30 RX ADMIN — Medication 600 MILLIGRAM(S): at 03:11

## 2024-03-30 RX ADMIN — SODIUM CHLORIDE 3 MILLILITER(S): 9 INJECTION INTRAMUSCULAR; INTRAVENOUS; SUBCUTANEOUS at 14:18

## 2024-03-30 RX ADMIN — Medication 975 MILLIGRAM(S): at 00:07

## 2024-03-30 RX ADMIN — Medication 600 MILLIGRAM(S): at 10:15

## 2024-03-30 RX ADMIN — Medication 600 MILLIGRAM(S): at 09:17

## 2024-03-30 RX ADMIN — Medication 975 MILLIGRAM(S): at 18:28

## 2024-03-30 RX ADMIN — Medication 600 MILLIGRAM(S): at 21:09

## 2024-03-30 RX ADMIN — SODIUM CHLORIDE 3 MILLILITER(S): 9 INJECTION INTRAMUSCULAR; INTRAVENOUS; SUBCUTANEOUS at 23:35

## 2024-03-30 RX ADMIN — Medication 975 MILLIGRAM(S): at 19:03

## 2024-03-30 RX ADMIN — Medication 975 MILLIGRAM(S): at 00:48

## 2024-03-30 RX ADMIN — SODIUM CHLORIDE 3 MILLILITER(S): 9 INJECTION INTRAMUSCULAR; INTRAVENOUS; SUBCUTANEOUS at 05:52

## 2024-03-30 RX ADMIN — Medication 975 MILLIGRAM(S): at 05:30

## 2024-03-30 RX ADMIN — PIPERACILLIN AND TAZOBACTAM 200 GRAM(S): 4; .5 INJECTION, POWDER, LYOPHILIZED, FOR SOLUTION INTRAVENOUS at 18:28

## 2024-03-30 RX ADMIN — Medication 975 MILLIGRAM(S): at 13:20

## 2024-03-30 RX ADMIN — Medication 975 MILLIGRAM(S): at 23:50

## 2024-03-30 NOTE — PROGRESS NOTE ADULT - ASSESSMENT
27yo PPD#2 s/p  complicated by pre-eclampsia with severe features, manual removal, and chorioamnionitis progressing to endometritis. Pt overall well appearing, continuing on IV AB.     #SPEC   - s/p Magnesium x24h PP   - HELLP labs on admission notable for AST/ALT: 49/88  -> 71/98->51/73. Repeat this AM. Holding acetaminophen  - Cr increased from 0.75 on admission to 1.26, improved to 1.05  - BP well controlled ON - CTM     #Endometritis   - Leukocytosis to 32 post delivery, increased to 36  f/u AM CBC   - s/p Manual removal of placenta  - C/W Zosyn (3/28 - )     #Post partum    - Pain well controlled, continue current pain regimen  - Increase ambulation, SCDs when not ambulating  - Continue regular diet    Millicent Valadez MD  PGY-3

## 2024-03-30 NOTE — PROGRESS NOTE ADULT - SUBJECTIVE AND OBJECTIVE BOX
S: Pain is well controlled with current pain regimen, improved from day prior. Denies fevers/ chills. She is tolerating a regular diet. Denies N/V. Ambulating without difficulty. Denies lightheadedness/dizziness. She is voiding spontaneously. Denies CP, SOB, HA, vision changes, RUQ pain, or significant LE edema. Lochia is wnl without saturation of pads/ passage of large clots.     O:  Vitals:  Vital Signs Last 24 Hrs  T(C): 36.6 (30 Mar 2024 00:12), Max: 36.9 (29 Mar 2024 06:00)  T(F): 97.8 (30 Mar 2024 00:12), Max: 98.4 (29 Mar 2024 06:00)  HR: 80 (30 Mar 2024 00:12) (64 - 87)  BP: 125/88 (30 Mar 2024 00:12) (99/62 - 132/84)  BP(mean): --  RR: 18 (30 Mar 2024 00:12) (18 - 18)  SpO2: 95% (30 Mar 2024 00:12) (95% - 99%)    Parameters below as of 30 Mar 2024 00:12  Patient On (Oxygen Delivery Method): room air        MEDICATIONS  (STANDING):  acetaminophen     Tablet .. 975 milliGRAM(s) Oral <User Schedule>  diphtheria/tetanus/pertussis (acellular) Vaccine (Adacel) 0.5 milliLiter(s) IntraMuscular once  ibuprofen  Tablet. 600 milliGRAM(s) Oral every 6 hours  magnesium sulfate Infusion 1 Gm/Hr (25 mL/Hr) IV Continuous <Continuous>  misoprostol 1000 MICROGram(s) Rectal once  oxytocin Infusion 41.667 milliUNIT(s)/Min (125 mL/Hr) IV Continuous <Continuous>  oxytocin Infusion 333.333 milliUNIT(s)/Min (1000 mL/Hr) IV Continuous <Continuous>  oxytocin Injectable 10 Unit(s) IntraMuscular once  piperacillin/tazobactam IVPB.. 4.5 Gram(s) IV Intermittent every 8 hours  prenatal multivitamin 1 Tablet(s) Oral daily  sodium chloride 0.9% lock flush 3 milliLiter(s) IV Push every 8 hours      Labs:  Blood type: A Positive  Rubella IgG: RPR: Negative                          11.1<L>   36.33<H> >-----------< 299    ( 03-29 @ 06:47 )             33.3<L>                        12.3   32.72<H> >-----------< 325    ( 03-29 @ 00:12 )             37.9                        12.6   19.70<H> >-----------< 305    ( 03-28 @ 16:49 )             38.0                        13.0   19.21<H> >-----------< 325    ( 03-28 @ 11:20 )             36.9    03-29-24 @ 06:47      131<L>  |  97  |  12  ----------------------------<  148<H>  4.1   |  19<L>  |  1.05    03-29-24 @ 00:12      137  |  99  |  13  ----------------------------<  106<H>  4.9   |  18<L>  |  1.26    03-28-24 @ 16:49      134<L>  |  100  |  11  ----------------------------<  89  5.0   |  21<L>  |  0.75        Ca    8.8      29 Mar 2024 06:47  Ca    9.6      29 Mar 2024 00:12  Ca    9.8      28 Mar 2024 16:49  Mg     4.4<H>     03-29  Mg     4.7<H>     03-29  Mg     5.2<H>     03-29  Mg     6.0<H>     03-29    TPro  5.9<L>  /  Alb  2.9<L>  /  TBili  0.7  /  DBili  x   /  AST  51<H>  /  ALT  73<H>  /  AlkPhos  245<H>  03-29-24 @ 06:47  TPro  6.6  /  Alb  3.3  /  TBili  0.7  /  DBili  x   /  AST  71<H>  /  ALT  98<H>  /  AlkPhos  307<H>  03-29-24 @ 00:12  TPro  7.3  /  Alb  3.7  /  TBili  0.7  /  DBili  x   /  AST  49<H>  /  ALT  88<H>  /  AlkPhos  310<H>  03-28-24 @ 16:49          Physical Exam:  General: NAD  Abdomen: soft, + fundal tenderness, improved from yesterday.   Vaginal: Lochia wnl  Extremities: No erythema/edema. No calf tenderness

## 2024-03-31 LAB
HCT VFR BLD CALC: 31.6 % — LOW (ref 34.5–45)
HGB BLD-MCNC: 10.8 G/DL — LOW (ref 11.5–15.5)
MCHC RBC-ENTMCNC: 29.2 PG — SIGNIFICANT CHANGE UP (ref 27–34)
MCHC RBC-ENTMCNC: 34.2 GM/DL — SIGNIFICANT CHANGE UP (ref 32–36)
MCV RBC AUTO: 85.4 FL — SIGNIFICANT CHANGE UP (ref 80–100)
NRBC # BLD: 0 /100 WBCS — SIGNIFICANT CHANGE UP (ref 0–0)
PLATELET # BLD AUTO: 368 K/UL — SIGNIFICANT CHANGE UP (ref 150–400)
RBC # BLD: 3.7 M/UL — LOW (ref 3.8–5.2)
RBC # FLD: 13.5 % — SIGNIFICANT CHANGE UP (ref 10.3–14.5)
WBC # BLD: 19.06 K/UL — HIGH (ref 3.8–10.5)
WBC # FLD AUTO: 19.06 K/UL — HIGH (ref 3.8–10.5)

## 2024-03-31 RX ORDER — LACTOBACILLUS ACIDOPHILUS 100MM CELL
1 CAPSULE ORAL ONCE
Refills: 0 | Status: COMPLETED | OUTPATIENT
Start: 2024-03-31 | End: 2024-03-31

## 2024-03-31 RX ORDER — PIPERACILLIN AND TAZOBACTAM 4; .5 G/20ML; G/20ML
4.5 INJECTION, POWDER, LYOPHILIZED, FOR SOLUTION INTRAVENOUS EVERY 8 HOURS
Refills: 0 | Status: DISCONTINUED | OUTPATIENT
Start: 2024-03-31 | End: 2024-03-31

## 2024-03-31 RX ORDER — PIPERACILLIN AND TAZOBACTAM 4; .5 G/20ML; G/20ML
4.5 INJECTION, POWDER, LYOPHILIZED, FOR SOLUTION INTRAVENOUS EVERY 8 HOURS
Refills: 0 | Status: DISCONTINUED | OUTPATIENT
Start: 2024-03-31 | End: 2024-04-01

## 2024-03-31 RX ADMIN — SODIUM CHLORIDE 3 MILLILITER(S): 9 INJECTION INTRAMUSCULAR; INTRAVENOUS; SUBCUTANEOUS at 14:50

## 2024-03-31 RX ADMIN — Medication 975 MILLIGRAM(S): at 00:35

## 2024-03-31 RX ADMIN — Medication 600 MILLIGRAM(S): at 22:10

## 2024-03-31 RX ADMIN — Medication 1 TABLET(S): at 12:37

## 2024-03-31 RX ADMIN — SODIUM CHLORIDE 3 MILLILITER(S): 9 INJECTION INTRAMUSCULAR; INTRAVENOUS; SUBCUTANEOUS at 06:50

## 2024-03-31 RX ADMIN — PIPERACILLIN AND TAZOBACTAM 200 GRAM(S): 4; .5 INJECTION, POWDER, LYOPHILIZED, FOR SOLUTION INTRAVENOUS at 06:50

## 2024-03-31 RX ADMIN — SODIUM CHLORIDE 3 MILLILITER(S): 9 INJECTION INTRAMUSCULAR; INTRAVENOUS; SUBCUTANEOUS at 21:42

## 2024-03-31 RX ADMIN — Medication 600 MILLIGRAM(S): at 21:10

## 2024-03-31 RX ADMIN — PIPERACILLIN AND TAZOBACTAM 200 GRAM(S): 4; .5 INJECTION, POWDER, LYOPHILIZED, FOR SOLUTION INTRAVENOUS at 21:09

## 2024-03-31 RX ADMIN — Medication 975 MILLIGRAM(S): at 07:05

## 2024-03-31 RX ADMIN — Medication 600 MILLIGRAM(S): at 04:15

## 2024-03-31 RX ADMIN — Medication 600 MILLIGRAM(S): at 03:33

## 2024-03-31 RX ADMIN — Medication 975 MILLIGRAM(S): at 05:10

## 2024-03-31 NOTE — PROGRESS NOTE ADULT - ASSESSMENT
27yo PPD#3 s/p  c/b sPEC (LFTs, RUQ pain), manual removal of placenta, and now endometritis. Pt overall well appearing, continuing on IV AB.     #SPEC   - s/p Magnesium x24h PP   - HELLP labs on admission notable for AST/ALT: 49/88  -> 71/98->51/73; downtrending. Holding acetaminophen  - Cr slightly uptrended but now downtrending   - BP well controlled ON - CTM     #Endometritis   - afebrile but remarkable fundal tenderness   - WBC: 32.7->36.3->25.2; f/u AM CBC  - s/p Manual removal of placenta  - C/W Zosyn (3/28 - )     #Post partum    - Pain well controlled, c/w Ibuprofen, Oxy PRN   - Increase ambulation, SCDs when not ambulating  - HSQ for DVT ppx   - Continue regular diet    AURA Edwards PGY3 27yo PPD#3 s/p  c/b sPEC (LFTs, RUQ pain), manual removal of placenta, and now endometritis. Pt overall well appearing, continuing on IV AB.     #sPEC   - s/p Magnesium x24h PP   - HELLP labs on admission notable for AST/ALT: 49/88  -> 71/98->51/73; downtrending. Holding acetaminophen  - Cr slightly uptrended but now downtrending   - BP well controlled ON - CTM     #Endometritis   - afebrile but remarkable fundal tenderness   - WBC: 32.7->36.3->25.2; f/u AM CBC  - s/p Manual removal of placenta  - C/W Zosyn (3/28 - )     #Post partum    - Pain well controlled, c/w Ibuprofen, Oxy PRN   - Increase ambulation, SCDs when not ambulating  - HSQ for DVT ppx   - Continue regular diet  - monitor for symptoms/evolvement of epigastric nodule    AURA Edwards PGY3

## 2024-03-31 NOTE — PROGRESS NOTE ADULT - SUBJECTIVE AND OBJECTIVE BOX
OB Progress Note:  PPD#3    S: 29yo PPD#3 s/p . Patient feels well. Pain is improved. She is tolerating a regular diet and passing flatus. She is voiding spontaneously, and ambulating without difficulty. Denies fevers/chills, CP/SOB. Denies lightheadedness/dizziness. Denies N/V.    O:  Vitals:   Vital Signs Last 24 Hrs  T(C): 36.6 (30 Mar 2024 23:58), Max: 37 (30 Mar 2024 16:47)  T(F): 97.9 (30 Mar 2024 23:58), Max: 98.6 (30 Mar 2024 16:47)  HR: 70 (30 Mar 2024 23:58) (64 - 79)  BP: 112/73 (30 Mar 2024 23:58) (112/70 - 127/76)  BP(mean): --  RR: 18 (30 Mar 2024 23:58) (18 - 18)  SpO2: 98% (30 Mar 2024 23:58) (95% - 98%)    Parameters below as of 30 Mar 2024 23:58  Patient On (Oxygen Delivery Method): room air        MEDICATIONS  (STANDING):  acetaminophen     Tablet .. 975 milliGRAM(s) Oral <User Schedule>  diphtheria/tetanus/pertussis (acellular) Vaccine (Adacel) 0.5 milliLiter(s) IntraMuscular once  ibuprofen  Tablet. 600 milliGRAM(s) Oral every 6 hours  magnesium sulfate Infusion 1 Gm/Hr (25 mL/Hr) IV Continuous <Continuous>  misoprostol 1000 MICROGram(s) Rectal once  oxytocin Infusion 333.333 milliUNIT(s)/Min (1000 mL/Hr) IV Continuous <Continuous>  oxytocin Infusion 41.667 milliUNIT(s)/Min (125 mL/Hr) IV Continuous <Continuous>  oxytocin Injectable 10 Unit(s) IntraMuscular once  prenatal multivitamin 1 Tablet(s) Oral daily  sodium chloride 0.9% lock flush 3 milliLiter(s) IV Push every 8 hours    MEDICATIONS  (PRN):  benzocaine 20%/menthol 0.5% Spray 1 Spray(s) Topical every 6 hours PRN for Perineal discomfort  dibucaine 1% Ointment 1 Application(s) Topical every 6 hours PRN Perineal discomfort  diphenhydrAMINE 25 milliGRAM(s) Oral every 6 hours PRN Pruritus  hydrocortisone 1% Cream 1 Application(s) Topical every 6 hours PRN Moderate Pain (4-6)  lanolin Ointment 1 Application(s) Topical every 6 hours PRN nipple soreness  magnesium hydroxide Suspension 30 milliLiter(s) Oral two times a day PRN Constipation  oxyCODONE    IR 5 milliGRAM(s) Oral every 3 hours PRN Moderate to Severe Pain (4-10)  oxyCODONE    IR 5 milliGRAM(s) Oral once PRN Moderate to Severe Pain (4-10)  pramoxine 1%/zinc 5% Cream 1 Application(s) Topical every 4 hours PRN Moderate Pain (4-6)  simethicone 80 milliGRAM(s) Chew every 4 hours PRN Gas  witch hazel Pads 1 Application(s) Topical every 4 hours PRN Perineal discomfort      Labs:  Blood type: A Positive  Rubella IgG: RPR: Negative                          10.9<L>   25.23<H> >-----------< 343    (  @ 06:46 )             31.8<L>                        11.1<L>   36.33<H> >-----------< 299    (  @ 06:47 )             33.3<L>                        12.3   32.72<H> >-----------< 325    (  @ 00:12 )             37.9                        12.6   19.70<H> >-----------< 305    (  @ 16:49 )             38.0                        13.0   19.21<H> >-----------< 325    (  @ 11:20 )             36.9    24 @ 06:46      138  |  103  |  17  ----------------------------<  63<L>  4.2   |  23  |  0.93    24 @ 06:47      131<L>  |  97  |  12  ----------------------------<  148<H>  4.1   |  19<L>  |  1.05    24 @ 00:12      137  |  99  |  13  ----------------------------<  106<H>  4.9   |  18<L>  |  1.26    24 @ 16:49      134<L>  |  100  |  11  ----------------------------<  89  5.0   |  21<L>  |  0.75        Ca    9.6      30 Mar 2024 06:46  Ca    8.8      29 Mar 2024 06:47  Ca    9.6      29 Mar 2024 00:12  Ca    9.8      28 Mar 2024 16:49  Mg     4.4<H>       Mg     4.7<H>       Mg     5.2<H>       Mg     6.0<H>         TPro  5.9<L>  /  Alb  2.9<L>  /  TBili  0.7  /  DBili  x   /  AST  51<H>  /  ALT  73<H>  /  AlkPhos  245<H>  24 @ 06:47  TPro  6.6  /  Alb  3.3  /  TBili  0.7  /  DBili  x   /  AST  71<H>  /  ALT  98<H>  /  AlkPhos  307<H>  24 @ 00:12  TPro  7.3  /  Alb  3.7  /  TBili  0.7  /  DBili  x   /  AST  49<H>  /  ALT  88<H>  /  AlkPhos  310<H>  24 @ 16:49          Physical Exam:  General: NAD  Abdomen: soft, tender to palpation, non-distended, fundus firm  Vaginal: Lochia wnl  Extremities: No erythema/edema     OB Progress Note:  PPD#3    S: 27yo PPD#3 s/p . Patient feels well. Pain is improved. She is tolerating a regular diet and passing flatus. She is voiding spontaneously, and ambulating without difficulty. Denies fevers/chills, CP/SOB. Denies lightheadedness/dizziness. Denies N/V.    O:  Vitals:   Vital Signs Last 24 Hrs  T(C): 36.6 (30 Mar 2024 23:58), Max: 37 (30 Mar 2024 16:47)  T(F): 97.9 (30 Mar 2024 23:58), Max: 98.6 (30 Mar 2024 16:47)  HR: 70 (30 Mar 2024 23:58) (64 - 79)  BP: 112/73 (30 Mar 2024 23:58) (112/70 - 127/76)  BP(mean): --  RR: 18 (30 Mar 2024 23:58) (18 - 18)  SpO2: 98% (30 Mar 2024 23:58) (95% - 98%)    Parameters below as of 30 Mar 2024 23:58  Patient On (Oxygen Delivery Method): room air        MEDICATIONS  (STANDING):  acetaminophen     Tablet .. 975 milliGRAM(s) Oral <User Schedule>  diphtheria/tetanus/pertussis (acellular) Vaccine (Adacel) 0.5 milliLiter(s) IntraMuscular once  ibuprofen  Tablet. 600 milliGRAM(s) Oral every 6 hours  magnesium sulfate Infusion 1 Gm/Hr (25 mL/Hr) IV Continuous <Continuous>  misoprostol 1000 MICROGram(s) Rectal once  oxytocin Infusion 333.333 milliUNIT(s)/Min (1000 mL/Hr) IV Continuous <Continuous>  oxytocin Infusion 41.667 milliUNIT(s)/Min (125 mL/Hr) IV Continuous <Continuous>  oxytocin Injectable 10 Unit(s) IntraMuscular once  prenatal multivitamin 1 Tablet(s) Oral daily  sodium chloride 0.9% lock flush 3 milliLiter(s) IV Push every 8 hours    MEDICATIONS  (PRN):  benzocaine 20%/menthol 0.5% Spray 1 Spray(s) Topical every 6 hours PRN for Perineal discomfort  dibucaine 1% Ointment 1 Application(s) Topical every 6 hours PRN Perineal discomfort  diphenhydrAMINE 25 milliGRAM(s) Oral every 6 hours PRN Pruritus  hydrocortisone 1% Cream 1 Application(s) Topical every 6 hours PRN Moderate Pain (4-6)  lanolin Ointment 1 Application(s) Topical every 6 hours PRN nipple soreness  magnesium hydroxide Suspension 30 milliLiter(s) Oral two times a day PRN Constipation  oxyCODONE    IR 5 milliGRAM(s) Oral every 3 hours PRN Moderate to Severe Pain (4-10)  oxyCODONE    IR 5 milliGRAM(s) Oral once PRN Moderate to Severe Pain (4-10)  pramoxine 1%/zinc 5% Cream 1 Application(s) Topical every 4 hours PRN Moderate Pain (4-6)  simethicone 80 milliGRAM(s) Chew every 4 hours PRN Gas  witch hazel Pads 1 Application(s) Topical every 4 hours PRN Perineal discomfort      Labs:  Blood type: A Positive  Rubella IgG: RPR: Negative                          10.9<L>   25.23<H> >-----------< 343    (  @ 06:46 )             31.8<L>                        11.1<L>   36.33<H> >-----------< 299    (  @ 06:47 )             33.3<L>                        12.3   32.72<H> >-----------< 325    (  @ 00:12 )             37.9                        12.6   19.70<H> >-----------< 305    (  @ 16:49 )             38.0                        13.0   19.21<H> >-----------< 325    (  @ 11:20 )             36.9    24 @ 06:46      138  |  103  |  17  ----------------------------<  63<L>  4.2   |  23  |  0.93    24 @ 06:47      131<L>  |  97  |  12  ----------------------------<  148<H>  4.1   |  19<L>  |  1.05    24 @ 00:12      137  |  99  |  13  ----------------------------<  106<H>  4.9   |  18<L>  |  1.26    24 @ 16:49      134<L>  |  100  |  11  ----------------------------<  89  5.0   |  21<L>  |  0.75        Ca    9.6      30 Mar 2024 06:46  Ca    8.8      29 Mar 2024 06:47  Ca    9.6      29 Mar 2024 00:12  Ca    9.8      28 Mar 2024 16:49  Mg     4.4<H>       Mg     4.7<H>       Mg     5.2<H>       Mg     6.0<H>         TPro  5.9<L>  /  Alb  2.9<L>  /  TBili  0.7  /  DBili  x   /  AST  51<H>  /  ALT  73<H>  /  AlkPhos  245<H>  24 @ 06:47  TPro  6.6  /  Alb  3.3  /  TBili  0.7  /  DBili  x   /  AST  71<H>  /  ALT  98<H>  /  AlkPhos  307<H>  24 @ 00:12  TPro  7.3  /  Alb  3.7  /  TBili  0.7  /  DBili  x   /  AST  49<H>  /  ALT  88<H>  /  AlkPhos  310<H>  24 @ 16:49          Physical Exam:  General: NAD  Abdomen: soft, tender to palpation, non-distended, fundus firm. 1x1cm firm, well circumscribed epigastric nodule, mildly tender to palpation   Vaginal: Lochia wnl  Extremities: No erythema/edema

## 2024-04-01 ENCOUNTER — TRANSCRIPTION ENCOUNTER (OUTPATIENT)
Age: 29
End: 2024-04-01

## 2024-04-01 VITALS
RESPIRATION RATE: 18 BRPM | SYSTOLIC BLOOD PRESSURE: 118 MMHG | TEMPERATURE: 98 F | HEART RATE: 93 BPM | OXYGEN SATURATION: 96 % | DIASTOLIC BLOOD PRESSURE: 78 MMHG

## 2024-04-01 PROBLEM — Z98.890 OTHER SPECIFIED POSTPROCEDURAL STATES: Chronic | Status: ACTIVE | Noted: 2024-03-28

## 2024-04-01 PROBLEM — J45.909 UNSPECIFIED ASTHMA, UNCOMPLICATED: Chronic | Status: ACTIVE | Noted: 2024-03-28

## 2024-04-01 LAB
ALBUMIN SERPL ELPH-MCNC: 3.3 G/DL — SIGNIFICANT CHANGE UP (ref 3.3–5)
ALP SERPL-CCNC: 215 U/L — HIGH (ref 40–120)
ALT FLD-CCNC: 104 U/L — HIGH (ref 10–45)
ANION GAP SERPL CALC-SCNC: 16 MMOL/L — SIGNIFICANT CHANGE UP (ref 5–17)
AST SERPL-CCNC: 68 U/L — HIGH (ref 10–40)
BASOPHILS # BLD AUTO: 0.09 K/UL — SIGNIFICANT CHANGE UP (ref 0–0.2)
BASOPHILS NFR BLD AUTO: 0.6 % — SIGNIFICANT CHANGE UP (ref 0–2)
BILIRUB SERPL-MCNC: 0.5 MG/DL — SIGNIFICANT CHANGE UP (ref 0.2–1.2)
BUN SERPL-MCNC: 16 MG/DL — SIGNIFICANT CHANGE UP (ref 7–23)
CALCIUM SERPL-MCNC: 9.2 MG/DL — SIGNIFICANT CHANGE UP (ref 8.4–10.5)
CHLORIDE SERPL-SCNC: 101 MMOL/L — SIGNIFICANT CHANGE UP (ref 96–108)
CO2 SERPL-SCNC: 20 MMOL/L — LOW (ref 22–31)
CREAT SERPL-MCNC: 0.64 MG/DL — SIGNIFICANT CHANGE UP (ref 0.5–1.3)
EGFR: 124 ML/MIN/1.73M2 — SIGNIFICANT CHANGE UP
EOSINOPHIL # BLD AUTO: 0.26 K/UL — SIGNIFICANT CHANGE UP (ref 0–0.5)
EOSINOPHIL NFR BLD AUTO: 1.8 % — SIGNIFICANT CHANGE UP (ref 0–6)
GLUCOSE SERPL-MCNC: 79 MG/DL — SIGNIFICANT CHANGE UP (ref 70–99)
HCT VFR BLD CALC: 32.2 % — LOW (ref 34.5–45)
HGB BLD-MCNC: 11 G/DL — LOW (ref 11.5–15.5)
IMM GRANULOCYTES NFR BLD AUTO: 1.1 % — HIGH (ref 0–0.9)
LYMPHOCYTES # BLD AUTO: 14.5 % — SIGNIFICANT CHANGE UP (ref 13–44)
LYMPHOCYTES # BLD AUTO: 2.08 K/UL — SIGNIFICANT CHANGE UP (ref 1–3.3)
MCHC RBC-ENTMCNC: 28.6 PG — SIGNIFICANT CHANGE UP (ref 27–34)
MCHC RBC-ENTMCNC: 34.2 GM/DL — SIGNIFICANT CHANGE UP (ref 32–36)
MCV RBC AUTO: 83.9 FL — SIGNIFICANT CHANGE UP (ref 80–100)
MONOCYTES # BLD AUTO: 0.78 K/UL — SIGNIFICANT CHANGE UP (ref 0–0.9)
MONOCYTES NFR BLD AUTO: 5.4 % — SIGNIFICANT CHANGE UP (ref 2–14)
NEUTROPHILS # BLD AUTO: 11 K/UL — HIGH (ref 1.8–7.4)
NEUTROPHILS NFR BLD AUTO: 76.6 % — SIGNIFICANT CHANGE UP (ref 43–77)
NRBC # BLD: 0 /100 WBCS — SIGNIFICANT CHANGE UP (ref 0–0)
PLATELET # BLD AUTO: 360 K/UL — SIGNIFICANT CHANGE UP (ref 150–400)
POTASSIUM SERPL-MCNC: 3.8 MMOL/L — SIGNIFICANT CHANGE UP (ref 3.5–5.3)
POTASSIUM SERPL-SCNC: 3.8 MMOL/L — SIGNIFICANT CHANGE UP (ref 3.5–5.3)
PROT SERPL-MCNC: 6.5 G/DL — SIGNIFICANT CHANGE UP (ref 6–8.3)
RBC # BLD: 3.84 M/UL — SIGNIFICANT CHANGE UP (ref 3.8–5.2)
RBC # FLD: 13.2 % — SIGNIFICANT CHANGE UP (ref 10.3–14.5)
SODIUM SERPL-SCNC: 137 MMOL/L — SIGNIFICANT CHANGE UP (ref 135–145)
WBC # BLD: 14.37 K/UL — HIGH (ref 3.8–10.5)
WBC # FLD AUTO: 14.37 K/UL — HIGH (ref 3.8–10.5)

## 2024-04-01 PROCEDURE — 86900 BLOOD TYPING SEROLOGIC ABO: CPT

## 2024-04-01 PROCEDURE — 82570 ASSAY OF URINE CREATININE: CPT

## 2024-04-01 PROCEDURE — 59050 FETAL MONITOR W/REPORT: CPT

## 2024-04-01 PROCEDURE — 86850 RBC ANTIBODY SCREEN: CPT

## 2024-04-01 PROCEDURE — 85027 COMPLETE CBC AUTOMATED: CPT

## 2024-04-01 PROCEDURE — 36415 COLL VENOUS BLD VENIPUNCTURE: CPT

## 2024-04-01 PROCEDURE — 86780 TREPONEMA PALLIDUM: CPT

## 2024-04-01 PROCEDURE — 81001 URINALYSIS AUTO W/SCOPE: CPT

## 2024-04-01 PROCEDURE — 80053 COMPREHEN METABOLIC PANEL: CPT

## 2024-04-01 PROCEDURE — 83735 ASSAY OF MAGNESIUM: CPT

## 2024-04-01 PROCEDURE — 85025 COMPLETE CBC W/AUTO DIFF WBC: CPT

## 2024-04-01 PROCEDURE — 83615 LACTATE (LD) (LDH) ENZYME: CPT

## 2024-04-01 PROCEDURE — 86901 BLOOD TYPING SEROLOGIC RH(D): CPT

## 2024-04-01 PROCEDURE — 80048 BASIC METABOLIC PNL TOTAL CA: CPT

## 2024-04-01 PROCEDURE — 84550 ASSAY OF BLOOD/URIC ACID: CPT

## 2024-04-01 PROCEDURE — 84156 ASSAY OF PROTEIN URINE: CPT

## 2024-04-01 RX ADMIN — SODIUM CHLORIDE 3 MILLILITER(S): 9 INJECTION INTRAMUSCULAR; INTRAVENOUS; SUBCUTANEOUS at 05:21

## 2024-04-01 RX ADMIN — PIPERACILLIN AND TAZOBACTAM 200 GRAM(S): 4; .5 INJECTION, POWDER, LYOPHILIZED, FOR SOLUTION INTRAVENOUS at 05:23

## 2024-04-01 NOTE — DISCHARGE NOTE OB - INSTRUCTIONS
Please follow up with your doctor as requested. Please check your blood pressure twice a day. Call your doctor if the top number is greater than 160 or if the bottom number is greater than 100.

## 2024-04-01 NOTE — DISCHARGE NOTE OB - CARE PROVIDER_API CALL
José Antonio Galvez  Obstetrics and Gynecology  [address]  [place] [address]  Phone: [phone]  Fax: [phone]  Follow Up Time:

## 2024-04-01 NOTE — DISCHARGE NOTE OB - PHYSICIAN SECTION COMPLETE
What to expect when recovering from your EGD (esophagogastroduodenoscopy)    For your procedure today you received sedation.  Please refer to the handout regarding your specific sedation.    Possible side-effects after your EGD:    Nausea may occur.  If you have nausea:   Take sips of white soda, tea, juice or water.  Eat smaller meals (avoid any fatty or deep fried foods).  If nausea lasts more than 24 hours, call your doctor that performed the procedure.      A sore throat is a common occurrence after your procedure.  If you have a sore throat, pain or discomfort:  Gargle with a warm salt water rinse.  Try throat lozenges.  You may take acetaminophen (Tylenol) unless instructed otherwise  If you had an EGD with dilatation, you may have pain behind your breastbone for a short period of time after the procedure.       Call your doctor if you have any of the following:  Signs of bleeding include:  Vomiting blood or coffee ground-like material.  Dark, black, or maroon colored stools.  Signs of infection include:  Temperature over 101 degrees F. and/or chills.  Redness or warmth around IV site.  If you have increased abdominal or chest pain.      Diet Instructions:  You may resume your normal diet.   Continue to drink extra fluids today such as water, juice, Gatorade, etc.   You may notice more belching or burping than usual; this is normal and should go away within a day or so.      The medication you received today may cause dizziness, drowsiness and impaired judgment.  Take it easy for the remainder of today.  You should not drive, operate heavy or potentially harmful equipment, make important legal decisions, or drink alcohol for 24 hours after receiving sedation.  Rise slowly from a sitting or lying position. The medications you received can cause you to become lightheaded or dizzy.  You may feel sore, stiff, or have slight bruising on your arm(s) from tape, blood pressure cuffs, or the IV site. This is normal and  should go away in a few days.    You may continue taking the medication that you usually take at home.    Findings:   EGD DONE 7/21/2023, REVEALED:  Persistent severe LA Grade C distal esophagitis with evidence of 4 linear clean based ulcers extending up from the GEJ to about 33 cm from incisors.Multiple distal esophageal biopsies obtained.  Evidence of scattered areas of erythema and superficial erosions involving the body and antrum, concerning for mild erosive gastritis.  This is an improvement from her prior upper endoscopy.  Normal duodenal mucosa till 2nd portion    RECOMMENDATIONS:  Recommend continuing omeprazole 40 mg BID X 8 weeks followed by repeat EGD to check for healing of severe esophagitis.  She most likely has persistent severe esophagitis secondary to medication non-adherence.  Recommend complete avoidance of NSAIDs  Complete hemostasis was also appreciated after the procedure was performed.  Patient was instructed to report to the emergency department if overt gastrointestinal bleeding occurs and verbalized understanding.  Await pathology with additional management recommendations to follow if necessitated.  Outpatient clinic follow-up with me as previously instructed.     Handouts given: Esophagitis    Additional information/questions:   Dr. Brown's office will call you with your biopsy results within 7 days.  Please call your doctor’s office if you have not heard from them in 7 days.    If you have any questions/concerns before this time, please feel free to call the GI lab at [facility] [phone].  In case of emergency, please go to the nearest emergency room for care.           Yes

## 2024-04-01 NOTE — DISCHARGE NOTE OB - HOSPITAL COURSE
29yo PPD#3 s/p  c/b sPEC (LFTs, RUQ pain), manual removal of placenta, and now endometritis. Pt overall well appearing, continuing on IV AB.

## 2024-04-01 NOTE — DISCHARGE NOTE OB - PLAN OF CARE
After discharge, please stay on pelvic rest for 6 weeks, meaning no sexual intercourse, no tampons and no douching.  No driving for 2 weeks as women can loose a lot of blood during delivery and there is a possibility of being lightheaded/fainting.  No lifting objects heavier than baby for two weeks.  Expect to have vaginal bleeding/spotting for up to six weeks.  The bleeding should get lighter and more white/light brown with time.  For bleeding soaking more than a pad an hour or passing clots greater than the size of your fist, come in to the emergency department.    Follow up in clinic in 6 weeks. - Continue BP meds as prescribed (hold is BP is under 110/60 or HR is under 60)  -Take blood pressure with at home cuff prior to taking medications; if BP is >150/90 call MD  - Return to hospital with headaches, visual changes, abdominal pain, nausea, vomiting, chest pain or shortness of breathe  - Follow up with your OB clinic in 2 days for BP check  - Follow up with [facility] (call [phone])

## 2024-04-01 NOTE — PROGRESS NOTE ADULT - SUBJECTIVE AND OBJECTIVE BOX
S: Pain is well controlled with current pain regimen, improved from days prior. Denies fevers/ chills.  She is tolerating a regular diet. Denies N/V. Ambulating without difficulty. Denies lightheadedness/dizziness. She is voiding spontaneously. Denies CP, SOB, HA, vision changes, RUQ pain, or significant LE edema. Lochia is wnl without saturation of pads/ passage of large clots.     O:  Vitals:  Vital Signs Last 24 Hrs  T(C): 36.8 (01 Apr 2024 00:31), Max: 36.9 (31 Mar 2024 16:37)  T(F): 98.3 (01 Apr 2024 00:31), Max: 98.4 (31 Mar 2024 16:37)  HR: 74 (01 Apr 2024 00:31) (64 - 86)  BP: 126/78 (01 Apr 2024 00:31) (123/78 - 131/75)  BP(mean): --  RR: 18 (01 Apr 2024 00:31) (18 - 18)  SpO2: 97% (01 Apr 2024 00:31) (95% - 98%)    Parameters below as of 01 Apr 2024 00:31  Patient On (Oxygen Delivery Method): room air        MEDICATIONS  (STANDING):  acetaminophen     Tablet .. 975 milliGRAM(s) Oral <User Schedule>  diphtheria/tetanus/pertussis (acellular) Vaccine (Adacel) 0.5 milliLiter(s) IntraMuscular once  ibuprofen  Tablet. 600 milliGRAM(s) Oral every 6 hours  magnesium sulfate Infusion 1 Gm/Hr (25 mL/Hr) IV Continuous <Continuous>  misoprostol 1000 MICROGram(s) Rectal once  oxytocin Infusion 333.333 milliUNIT(s)/Min (1000 mL/Hr) IV Continuous <Continuous>  oxytocin Infusion 41.667 milliUNIT(s)/Min (125 mL/Hr) IV Continuous <Continuous>  oxytocin Injectable 10 Unit(s) IntraMuscular once  piperacillin/tazobactam IVPB.- 4.5 Gram(s) IV Intermittent every 8 hours  prenatal multivitamin 1 Tablet(s) Oral daily  sodium chloride 0.9% lock flush 3 milliLiter(s) IV Push every 8 hours      Labs:  Blood type: A Positive  Rubella IgG: RPR: Negative                          10.8<L>   19.06<H> >-----------< 368    ( 03-31 @ 06:22 )             31.6<L>                        10.9<L>   25.23<H> >-----------< 343    ( 03-30 @ 06:46 )             31.8<L>                        11.1<L>   36.33<H> >-----------< 299    ( 03-29 @ 06:47 )             33.3<L>    03-30-24 @ 06:46      138  |  103  |  17  ----------------------------<  63<L>  4.2   |  23  |  0.93    03-29-24 @ 06:47      131<L>  |  97  |  12  ----------------------------<  148<H>  4.1   |  19<L>  |  1.05        Ca    9.6      30 Mar 2024 06:46  Ca    8.8      29 Mar 2024 06:47  Mg     4.4<H>     03-29  Mg     4.7<H>     03-29  Mg     5.2<H>     03-29    TPro  5.9<L>  /  Alb  2.9<L>  /  TBili  0.7  /  DBili  x   /  AST  51<H>  /  ALT  73<H>  /  AlkPhos  245<H>  03-29-24 @ 06:47          Physical Exam:  General: NAD  Abdomen: soft, non-tender, non-distended, fundus firm and below the umbilicus   Vaginal: Lochia wnl  Extremities: No erythema/edema. No calf tenderness. SCDs in place.

## 2024-04-01 NOTE — DISCHARGE NOTE OB - PATIENT PORTAL LINK FT
You can access the FollowMyHealth Patient Portal offered by [facility] by registering at the following website: [email or web address]. By joining [email or web address] FollowMyHealth portal, you will also be able to view your health information using other applications (apps) compatible with our system.

## 2024-04-01 NOTE — PROGRESS NOTE ADULT - ASSESSMENT
29yo PPD#3 s/p  c/b sPEC (LFTs, RUQ pain), manual removal of placenta, and now endometritis. Pt overall well appearing, continuing on IV AB.     #sPEC   - s/p Magnesium x24h PP   - HELLP labs on admission notable for AST/ALT: 49/88, increased to 71/98; downtrending. Holding acetaminophen  - Cr slightly uptrended but now downtrending   - BP well controlled ON - CTM     #Endometritis   - afebrile, fundal tenderness resolved   - WBC: 32.7->36.3->25.2->19; f/u AM CBC  - s/p Manual removal of placenta  - C/W Zosyn (3/28 - )     #Post partum    - Pain well controlled, c/w Ibuprofen, Oxy PRN   - Increase ambulation, SCDs when not ambulating  - HSQ for DVT ppx   - Continue regular diet    Millicent Valadez, PGY3

## 2024-04-01 NOTE — DISCHARGE NOTE OB - CARE PLAN
1 Principal Discharge DX:	Normal vaginal delivery  Assessment and plan of treatment:	After discharge, please stay on pelvic rest for 6 weeks, meaning no sexual intercourse, no tampons and no douching.  No driving for 2 weeks as women can loose a lot of blood during delivery and there is a possibility of being lightheaded/fainting.  No lifting objects heavier than baby for two weeks.  Expect to have vaginal bleeding/spotting for up to six weeks.  The bleeding should get lighter and more white/light brown with time.  For bleeding soaking more than a pad an hour or passing clots greater than the size of your fist, come in to the emergency department.    Follow up in clinic in 6 weeks.  Secondary Diagnosis:	Severe preeclampsia, third trimester  Assessment and plan of treatment:	- Continue BP meds as prescribed (hold is BP is under 110/60 or HR is under 60)  -Take blood pressure with at home cuff prior to taking medications; if BP is >150/90 call MD  - Return to hospital with headaches, visual changes, abdominal pain, nausea, vomiting, chest pain or shortness of breathe  - Follow up with your OB clinic in 2 days for BP check  - Follow up with [facility] (call [phone])

## 2024-04-02 ENCOUNTER — NON-APPOINTMENT (OUTPATIENT)
Age: 29
End: 2024-04-02

## 2024-04-02 PROBLEM — Z00.00 ENCOUNTER FOR PREVENTIVE HEALTH EXAMINATION: Status: ACTIVE | Noted: 2024-04-02

## 2024-04-11 ENCOUNTER — NON-APPOINTMENT (OUTPATIENT)
Age: 29
End: 2024-04-11

## 2024-04-22 ENCOUNTER — APPOINTMENT (OUTPATIENT)
Dept: CARDIOLOGY | Facility: CLINIC | Age: 29
End: 2024-04-22
Payer: COMMERCIAL

## 2024-04-22 VITALS
OXYGEN SATURATION: 99 % | SYSTOLIC BLOOD PRESSURE: 94 MMHG | WEIGHT: 176 LBS | BODY MASS INDEX: 31.18 KG/M2 | HEIGHT: 63 IN | HEART RATE: 76 BPM | DIASTOLIC BLOOD PRESSURE: 70 MMHG

## 2024-04-22 VITALS — SYSTOLIC BLOOD PRESSURE: 94 MMHG | DIASTOLIC BLOOD PRESSURE: 70 MMHG

## 2024-04-22 DIAGNOSIS — Z78.9 OTHER SPECIFIED HEALTH STATUS: ICD-10-CM

## 2024-04-22 DIAGNOSIS — Z87.09 PERSONAL HISTORY OF OTHER DISEASES OF THE RESPIRATORY SYSTEM: ICD-10-CM

## 2024-04-22 DIAGNOSIS — O14.90 UNSPECIFIED PRE-ECLAMPSIA, UNSPECIFIED TRIMESTER: ICD-10-CM

## 2024-04-22 DIAGNOSIS — Z83.438 FAMILY HISTORY OF OTHER DISORDER OF LIPOPROTEIN METABOLISM AND OTHER LIPIDEMIA: ICD-10-CM

## 2024-04-22 PROCEDURE — 93000 ELECTROCARDIOGRAM COMPLETE: CPT

## 2024-04-22 PROCEDURE — 99203 OFFICE O/P NEW LOW 30 MIN: CPT | Mod: 25

## 2024-04-22 NOTE — DISCUSSION/SUMMARY
[FreeTextEntry1] : Preeclampsia Off of medication In view of the patient's increased risk of hypertension, coronary artery disease, kidney disease, and CVA she was instructed to follow-up regularly with her primary care physician Reinitiate exercise when able  Return to this office as needed [EKG obtained to assist in diagnosis and management of assessed problem(s)] : EKG obtained to assist in diagnosis and management of assessed problem(s)

## 2024-04-22 NOTE — HISTORY OF PRESENT ILLNESS
[FreeTextEntry1] : The patient is a 28-year-old white female who delivered 3 weeks ago and experienced preeclampsia. The patient is a  and had been exercising regularly during her pregnancy.  She is chest pain and dyspnea free.  As noted below she no longer requires antihypertensive therapy.

## 2024-04-30 ENCOUNTER — NON-APPOINTMENT (OUTPATIENT)
Age: 29
End: 2024-04-30

## 2024-11-01 NOTE — PROGRESS NOTE ADULT - ATTENDING COMMENTS
pt seen and examined  pt remains afebrile  Uterus : tender on exam  ext -c/c/e    A/P s/p  with PEC /Chorio now endometritis --wbc was 36 trending down  follow BP closely  Uterus  and wbc elevated -continue abx  daily cbc  not cleared for discharge
pt seen and examined   uterus -less tender than yesterday  ext -c/c/e  a/p s/p  with pec and chorio   wbc trending down  will continue abx for another 24 hr
Afib noted on EKG 10/12, reverted to NSR.   - Was not on AC PTA 2/2 hx of rectal bleeding. Started on Eliquis this admission but discontinued given rectal bleeding   - c/w toprol 50mg qd